# Patient Record
Sex: FEMALE | Race: WHITE | NOT HISPANIC OR LATINO | Employment: OTHER | URBAN - NONMETROPOLITAN AREA
[De-identification: names, ages, dates, MRNs, and addresses within clinical notes are randomized per-mention and may not be internally consistent; named-entity substitution may affect disease eponyms.]

---

## 2017-05-12 ENCOUNTER — OFFICE VISIT (OUTPATIENT)
Dept: CARDIOLOGY | Facility: CLINIC | Age: 82
End: 2017-05-12

## 2017-05-12 VITALS
HEIGHT: 69 IN | OXYGEN SATURATION: 98 % | SYSTOLIC BLOOD PRESSURE: 160 MMHG | DIASTOLIC BLOOD PRESSURE: 80 MMHG | WEIGHT: 162 LBS | BODY MASS INDEX: 23.99 KG/M2 | HEART RATE: 74 BPM

## 2017-05-12 DIAGNOSIS — I10 ESSENTIAL HYPERTENSION: Chronic | ICD-10-CM

## 2017-05-12 DIAGNOSIS — R00.1 SYMPTOMATIC BRADYCARDIA: Primary | Chronic | ICD-10-CM

## 2017-05-12 DIAGNOSIS — Z01.818 PREOPERATIVE CLEARANCE: ICD-10-CM

## 2017-05-12 PROCEDURE — 99204 OFFICE O/P NEW MOD 45 MIN: CPT | Performed by: NURSE PRACTITIONER

## 2017-05-12 PROCEDURE — 93000 ELECTROCARDIOGRAM COMPLETE: CPT | Performed by: NURSE PRACTITIONER

## 2017-05-12 RX ORDER — ASPIRIN 81 MG/1
162 TABLET, CHEWABLE ORAL DAILY
COMMUNITY

## 2017-05-12 RX ORDER — UBIDECARENONE 75 MG
50 CAPSULE ORAL DAILY
COMMUNITY
End: 2017-05-12

## 2017-05-12 RX ORDER — AMLODIPINE BESYLATE 5 MG/1
5 TABLET ORAL DAILY
COMMUNITY

## 2017-05-12 RX ORDER — CHOLECALCIFEROL (VITAMIN D3) 125 MCG
250 CAPSULE ORAL DAILY
COMMUNITY

## 2017-05-17 ENCOUNTER — OUTSIDE FACILITY SERVICE (OUTPATIENT)
Dept: CARDIOLOGY | Facility: CLINIC | Age: 82
End: 2017-05-17

## 2017-05-17 PROCEDURE — 99214 OFFICE O/P EST MOD 30 MIN: CPT | Performed by: NURSE PRACTITIONER

## 2017-05-30 ENCOUNTER — TELEPHONE (OUTPATIENT)
Dept: CARDIOLOGY | Facility: CLINIC | Age: 82
End: 2017-05-30

## 2017-05-30 DIAGNOSIS — M79.89 LEFT ARM SWELLING: Primary | ICD-10-CM

## 2017-05-30 DIAGNOSIS — Z95.0 PACEMAKER: ICD-10-CM

## 2017-06-05 DIAGNOSIS — Z95.0 PACEMAKER: ICD-10-CM

## 2017-06-05 DIAGNOSIS — M79.89 LEFT ARM SWELLING: ICD-10-CM

## 2017-06-27 ENCOUNTER — CLINICAL SUPPORT (OUTPATIENT)
Dept: CARDIOLOGY | Facility: CLINIC | Age: 82
End: 2017-06-27

## 2017-06-27 DIAGNOSIS — Z95.0 PACEMAKER: ICD-10-CM

## 2017-06-27 DIAGNOSIS — R00.1 SYMPTOMATIC BRADYCARDIA: Chronic | ICD-10-CM

## 2017-06-27 PROCEDURE — 93288 INTERROG EVL PM/LDLS PM IP: CPT | Performed by: NURSE PRACTITIONER

## 2017-06-27 NOTE — PROGRESS NOTES
Pacemaker Evaluation Report    June 27, 2017    Indication for pacemaker: sinus node dysfunction - chronotropic incompetence    Implanting MD: Dr. Morse    : [employer]onic  Model: Ritesha DR VAUGHN [id number]    Implant Date: 5/16/2017    Reason For Evaluation: routine      DIAGNOSTIC DATA    Atrial paced: 20% Ventricular paced: less than 0.1%    Battery status: satisfactory  Voltage: 3.08 V  Estimated battery life: 11 years      FINAL PARAMETERS    Changes made: none    Conclusions: normal pacemaker function, stable pacing and sensing thresholds and adequate battery reserve    Return in about 6 months (around 12/27/2017) for [facility], 1 year in office ([employer]).        Janice Hollins, APRN, ACNP-BC, CHFN-BC

## 2018-07-25 ENCOUNTER — TELEPHONE (OUTPATIENT)
Dept: CARDIOLOGY | Facility: CLINIC | Age: 83
End: 2018-07-25

## 2018-07-25 NOTE — TELEPHONE ENCOUNTER
Called patient to confirmed her switching to Dr. Broadbent's office. I received a transfer request via [employer]. Left message for patient to return my call.